# Patient Record
Sex: MALE | Race: WHITE | NOT HISPANIC OR LATINO | Employment: FULL TIME | URBAN - METROPOLITAN AREA
[De-identification: names, ages, dates, MRNs, and addresses within clinical notes are randomized per-mention and may not be internally consistent; named-entity substitution may affect disease eponyms.]

---

## 2018-08-17 ENCOUNTER — HOSPITAL ENCOUNTER (INPATIENT)
Facility: HOSPITAL | Age: 53
LOS: 3 days | Discharge: HOME | DRG: 419 | End: 2018-08-20
Attending: FAMILY MEDICINE | Admitting: SURGERY
Payer: COMMERCIAL

## 2018-08-17 DIAGNOSIS — K81.0 ACUTE GANGRENOUS CHOLECYSTITIS: ICD-10-CM

## 2018-08-17 DIAGNOSIS — K81.0 CHOLECYSTITIS, ACUTE: Primary | ICD-10-CM

## 2018-08-17 LAB
ALBUMIN SERPL BCP-MCNC: 4.4 G/DL
ALP SERPL-CCNC: 61 U/L
ALT SERPL W/O P-5'-P-CCNC: 19 U/L
ANION GAP SERPL CALC-SCNC: 12 MMOL/L
AST SERPL-CCNC: 28 U/L
BACTERIA #/AREA URNS AUTO: ABNORMAL /HPF
BASOPHILS # BLD AUTO: 0.01 K/UL
BASOPHILS NFR BLD: 0.1 %
BILIRUB SERPL-MCNC: 3.2 MG/DL
BILIRUB UR QL STRIP: ABNORMAL
BUN SERPL-MCNC: 15 MG/DL
CALCIUM SERPL-MCNC: 9.3 MG/DL
CHLORIDE SERPL-SCNC: 104 MMOL/L
CLARITY UR REFRACT.AUTO: CLEAR
CO2 SERPL-SCNC: 25 MMOL/L
COLOR UR AUTO: ABNORMAL
CREAT SERPL-MCNC: 1.28 MG/DL
DIFFERENTIAL METHOD: ABNORMAL
EOSINOPHIL # BLD AUTO: 0 K/UL
EOSINOPHIL NFR BLD: 0 %
ERYTHROCYTE [DISTWIDTH] IN BLOOD BY AUTOMATED COUNT: 12.4 %
EST. GFR  (AFRICAN AMERICAN): >60 ML/MIN/1.73 M^2
EST. GFR  (NON AFRICAN AMERICAN): >60 ML/MIN/1.73 M^2
GLUCOSE SERPL-MCNC: 154 MG/DL
GLUCOSE UR QL STRIP: NEGATIVE
GRAN CASTS UR QL COMP ASSIST: 2 /LPF
HCT VFR BLD AUTO: 43.7 %
HGB BLD-MCNC: 14.9 G/DL
HGB UR QL STRIP: ABNORMAL
HYALINE CASTS UR QL AUTO: 10 /LPF
KETONES UR QL STRIP: ABNORMAL
LACTATE SERPL-SCNC: 1.4 MMOL/L
LEUKOCYTE ESTERASE UR QL STRIP: ABNORMAL
LIPASE SERPL-CCNC: 107 U/L
LYMPHOCYTES # BLD AUTO: 0.6 K/UL
LYMPHOCYTES NFR BLD: 4.1 %
MCH RBC QN AUTO: 30.6 PG
MCHC RBC AUTO-ENTMCNC: 34.1 G/DL
MCV RBC AUTO: 90 FL
MICROSCOPIC COMMENT: ABNORMAL
MONOCYTES # BLD AUTO: 1.2 K/UL
MONOCYTES NFR BLD: 8.6 %
NEUTROPHILS # BLD AUTO: 12.1 K/UL
NEUTROPHILS NFR BLD: 87 %
NITRITE UR QL STRIP: NEGATIVE
PH UR STRIP: 5 [PH] (ref 5–8)
PLATELET # BLD AUTO: 193 K/UL
PMV BLD AUTO: 10.6 FL
POTASSIUM SERPL-SCNC: 4 MMOL/L
PROT SERPL-MCNC: 7.4 G/DL
PROT UR QL STRIP: ABNORMAL
RBC # BLD AUTO: 4.87 M/UL
RBC #/AREA URNS AUTO: 2 /HPF (ref 0–4)
SODIUM SERPL-SCNC: 141 MMOL/L
SP GR UR STRIP: 1.01 (ref 1–1.03)
URN SPEC COLLECT METH UR: ABNORMAL
UROBILINOGEN UR STRIP-ACNC: 1 EU/DL
WAXY CASTS UR QL COMP ASSIST: 1 /LPF
WBC # BLD AUTO: 13.85 K/UL
WBC #/AREA URNS AUTO: 10 /HPF (ref 0–5)

## 2018-08-17 PROCEDURE — 96366 THER/PROPH/DIAG IV INF ADDON: CPT

## 2018-08-17 PROCEDURE — 96361 HYDRATE IV INFUSION ADD-ON: CPT

## 2018-08-17 PROCEDURE — 63600175 PHARM REV CODE 636 W HCPCS: Performed by: NURSE PRACTITIONER

## 2018-08-17 PROCEDURE — 80053 COMPREHEN METABOLIC PANEL: CPT

## 2018-08-17 PROCEDURE — 83690 ASSAY OF LIPASE: CPT

## 2018-08-17 PROCEDURE — 25500020 PHARM REV CODE 255: Performed by: NURSE PRACTITIONER

## 2018-08-17 PROCEDURE — 96375 TX/PRO/DX INJ NEW DRUG ADDON: CPT

## 2018-08-17 PROCEDURE — 96365 THER/PROPH/DIAG IV INF INIT: CPT

## 2018-08-17 PROCEDURE — 96367 TX/PROPH/DG ADDL SEQ IV INF: CPT

## 2018-08-17 PROCEDURE — 63600175 PHARM REV CODE 636 W HCPCS: Performed by: SURGERY

## 2018-08-17 PROCEDURE — 85025 COMPLETE CBC W/AUTO DIFF WBC: CPT

## 2018-08-17 PROCEDURE — 99285 EMERGENCY DEPT VISIT HI MDM: CPT | Mod: 25

## 2018-08-17 PROCEDURE — 81000 URINALYSIS NONAUTO W/SCOPE: CPT

## 2018-08-17 PROCEDURE — 87040 BLOOD CULTURE FOR BACTERIA: CPT

## 2018-08-17 PROCEDURE — 25000003 PHARM REV CODE 250: Performed by: NURSE PRACTITIONER

## 2018-08-17 PROCEDURE — 25000003 PHARM REV CODE 250: Performed by: STUDENT IN AN ORGANIZED HEALTH CARE EDUCATION/TRAINING PROGRAM

## 2018-08-17 PROCEDURE — S0030 INJECTION, METRONIDAZOLE: HCPCS | Performed by: NURSE PRACTITIONER

## 2018-08-17 PROCEDURE — 11000001 HC ACUTE MED/SURG PRIVATE ROOM

## 2018-08-17 PROCEDURE — 83605 ASSAY OF LACTIC ACID: CPT

## 2018-08-17 RX ORDER — SODIUM CHLORIDE, SODIUM LACTATE, POTASSIUM CHLORIDE, CALCIUM CHLORIDE 600; 310; 30; 20 MG/100ML; MG/100ML; MG/100ML; MG/100ML
INJECTION, SOLUTION INTRAVENOUS CONTINUOUS
Status: DISCONTINUED | OUTPATIENT
Start: 2018-08-17 | End: 2018-08-19

## 2018-08-17 RX ORDER — ONDANSETRON 2 MG/ML
4 INJECTION INTRAMUSCULAR; INTRAVENOUS EVERY 6 HOURS PRN
Status: DISCONTINUED | OUTPATIENT
Start: 2018-08-17 | End: 2018-08-20 | Stop reason: HOSPADM

## 2018-08-17 RX ORDER — MORPHINE SULFATE 4 MG/ML
4 INJECTION, SOLUTION INTRAMUSCULAR; INTRAVENOUS
Status: COMPLETED | OUTPATIENT
Start: 2018-08-17 | End: 2018-08-17

## 2018-08-17 RX ORDER — ACETAMINOPHEN 325 MG/1
650 TABLET ORAL EVERY 6 HOURS PRN
Status: DISCONTINUED | OUTPATIENT
Start: 2018-08-17 | End: 2018-08-20 | Stop reason: HOSPADM

## 2018-08-17 RX ORDER — METRONIDAZOLE 500 MG/100ML
500 INJECTION, SOLUTION INTRAVENOUS
Status: COMPLETED | OUTPATIENT
Start: 2018-08-17 | End: 2018-08-17

## 2018-08-17 RX ORDER — DICYCLOMINE HYDROCHLORIDE 10 MG/ML
20 INJECTION INTRAMUSCULAR
Status: ACTIVE | OUTPATIENT
Start: 2018-08-17 | End: 2018-08-17

## 2018-08-17 RX ORDER — ONDANSETRON 2 MG/ML
4 INJECTION INTRAMUSCULAR; INTRAVENOUS
Status: COMPLETED | OUTPATIENT
Start: 2018-08-17 | End: 2018-08-17

## 2018-08-17 RX ORDER — ONDANSETRON 4 MG/1
4 TABLET, FILM COATED ORAL EVERY 6 HOURS
Qty: 12 TABLET | Refills: 0 | Status: SHIPPED | OUTPATIENT
Start: 2018-08-17 | End: 2018-08-17 | Stop reason: SDUPTHER

## 2018-08-17 RX ORDER — OMEPRAZOLE 20 MG/1
20 TABLET, DELAYED RELEASE ORAL DAILY
COMMUNITY

## 2018-08-17 RX ORDER — SODIUM CHLORIDE 9 MG/ML
1000 INJECTION, SOLUTION INTRAVENOUS
Status: COMPLETED | OUTPATIENT
Start: 2018-08-17 | End: 2018-08-17

## 2018-08-17 RX ORDER — ACETAMINOPHEN 325 MG/1
650 TABLET ORAL
Status: COMPLETED | OUTPATIENT
Start: 2018-08-17 | End: 2018-08-17

## 2018-08-17 RX ORDER — CIPROFLOXACIN 2 MG/ML
400 INJECTION, SOLUTION INTRAVENOUS
Status: COMPLETED | OUTPATIENT
Start: 2018-08-17 | End: 2018-08-17

## 2018-08-17 RX ORDER — MORPHINE SULFATE 4 MG/ML
4 INJECTION, SOLUTION INTRAMUSCULAR; INTRAVENOUS
Status: DISPENSED | OUTPATIENT
Start: 2018-08-17 | End: 2018-08-18

## 2018-08-17 RX ORDER — KETOROLAC TROMETHAMINE 30 MG/ML
INJECTION, SOLUTION INTRAMUSCULAR; INTRAVENOUS
Status: DISPENSED
Start: 2018-08-17 | End: 2018-08-18

## 2018-08-17 RX ORDER — MORPHINE SULFATE 2 MG/ML
1 INJECTION, SOLUTION INTRAMUSCULAR; INTRAVENOUS EVERY 4 HOURS PRN
Status: DISCONTINUED | OUTPATIENT
Start: 2018-08-17 | End: 2018-08-18

## 2018-08-17 RX ORDER — HYDROGEN PEROXIDE 3 %
20 SOLUTION, NON-ORAL MISCELLANEOUS
COMMUNITY

## 2018-08-17 RX ORDER — KETOROLAC TROMETHAMINE 30 MG/ML
15 INJECTION, SOLUTION INTRAMUSCULAR; INTRAVENOUS EVERY 6 HOURS PRN
Status: DISCONTINUED | OUTPATIENT
Start: 2018-08-17 | End: 2018-08-20 | Stop reason: HOSPADM

## 2018-08-17 RX ORDER — KETOROLAC TROMETHAMINE 30 MG/ML
30 INJECTION, SOLUTION INTRAMUSCULAR; INTRAVENOUS
Status: COMPLETED | OUTPATIENT
Start: 2018-08-17 | End: 2018-08-17

## 2018-08-17 RX ORDER — ONDANSETRON 4 MG/1
4 TABLET, FILM COATED ORAL EVERY 6 HOURS
Qty: 12 TABLET | Refills: 0 | Status: SHIPPED | OUTPATIENT
Start: 2018-08-17 | End: 2018-08-20 | Stop reason: SDUPTHER

## 2018-08-17 RX ADMIN — ONDANSETRON 4 MG: 2 INJECTION INTRAMUSCULAR; INTRAVENOUS at 12:08

## 2018-08-17 RX ADMIN — METRONIDAZOLE 500 MG: 500 INJECTION, SOLUTION INTRAVENOUS at 02:08

## 2018-08-17 RX ADMIN — CIPROFLOXACIN 400 MG: 2 INJECTION, SOLUTION INTRAVENOUS at 04:08

## 2018-08-17 RX ADMIN — KETOROLAC TROMETHAMINE 30 MG: 30 INJECTION, SOLUTION INTRAMUSCULAR at 02:08

## 2018-08-17 RX ADMIN — SODIUM CHLORIDE 1000 ML: 0.9 INJECTION, SOLUTION INTRAVENOUS at 04:08

## 2018-08-17 RX ADMIN — ACETAMINOPHEN 650 MG: 325 TABLET ORAL at 01:08

## 2018-08-17 RX ADMIN — PIPERACILLIN AND TAZOBACTAM 4.5 G: 4; .5 INJECTION, POWDER, LYOPHILIZED, FOR SOLUTION INTRAVENOUS; PARENTERAL at 10:08

## 2018-08-17 RX ADMIN — SODIUM CHLORIDE 1000 ML: 0.9 INJECTION, SOLUTION INTRAVENOUS at 12:08

## 2018-08-17 RX ADMIN — SODIUM CHLORIDE, SODIUM LACTATE, POTASSIUM CHLORIDE, AND CALCIUM CHLORIDE: .6; .31; .03; .02 INJECTION, SOLUTION INTRAVENOUS at 09:08

## 2018-08-17 RX ADMIN — IOHEXOL 100 ML: 350 INJECTION, SOLUTION INTRAVENOUS at 02:08

## 2018-08-17 RX ADMIN — MORPHINE SULFATE 4 MG: 4 INJECTION INTRAVENOUS at 04:08

## 2018-08-17 NOTE — ED NOTES
Pt sitting up watching TV resting comfort. States pain 3/10. Call bell within reach, bed low position.m

## 2018-08-17 NOTE — ED PROVIDER NOTES
Current Discharge Medication List      START taking these medications    Details   ondansetron (ZOFRAN) 4 MG tablet Take 1 tablet (4 mg total) by mouth every 6 (six) hours.  Qty: 12 tablet, Refills: 0          eMERGENCY dEPARTMENT eNCOUnter    CHIEF COMPLAINT    Chief Complaint   Patient presents with    Emesis     Pt reports abdominal pain and emesis that started last night. Pt reports he thinks he has food poisining. Sent from urgent care    Abdominal Pain       HPI    Hamlet Villalobos is a 52 y.o. male who presents to the ED with abdominal pain and vomiting that began last night.  He states he is vomiting constantly aching keep anything down.  Reports cramping upper abdominal pain that is constant.  He denies fever or diarrhea.  He states he thinks he has food poisoning from a burritio that he bought from [employer].  States the vomiting and stomach pain began about 20 min after he ate.  He denies difficulty urinating.    CURRENT MEDICATIONS    No current facility-administered medications on file prior to encounter.      Current Outpatient Medications on File Prior to Encounter   Medication Sig Dispense Refill    esomeprazole (NEXIUM) 20 MG capsule Take 20 mg by mouth before breakfast.      omeprazole (PRILOSEC OTC) 20 MG tablet Take 20 mg by mouth once daily.           ALLERGIES    Review of patient's allergies indicates:  No Known Allergies    PAST MEDICAL HISTORY  Past Medical History:   Diagnosis Date    Acid reflux     Barretts esophagus        SURGICAL HISTORY    History reviewed. No pertinent surgical history.    SOCIAL HISTORY    Social History     Socioeconomic History    Marital status:      Spouse name: None    Number of children: None    Years of education: None    Highest education level: None   Social Needs    Financial resource strain: None    Food insecurity - worry: None    Food insecurity - inability: None    Transportation needs - medical: None    Transportation needs -  "non-medical: None   Occupational History    None   Tobacco Use    Smoking status: Never Smoker    Smokeless tobacco: Never Used   Substance and Sexual Activity    Alcohol use: No     Frequency: Never    Drug use: No    Sexual activity: None   Other Topics Concern    None   Social History Narrative    None       FAMILY HISTORY    History reviewed. No pertinent family history.    REVIEW OF SYSTEMS   ROS  Constitutional:  No fever, chills, weight loss or weakness.   Eyes:  No  Photophobia, blurred vision or discharge.   HENT:  No ear pain, nasal congestion or sore throat..  Respiratory:  No cough, shortness of breath or wheezing.   Cardiovascular:  No chest pain, palpitations or swelling.   GI:  Reports abdominal pain, nausea, vomiting, no diarrhea.  : No dysuria, frequency   Musculoskeletal:  No back pain or neck pain.   Skin:  No reported rashes or infected lesions.   Neurologic:  No reported headache.  All Systems otherwise negative except as noted in the History of Present Illness.        PHYSICAL EXAM    Reviewed Triage Note  VITAL SIGNS: /62   Pulse 106   Temp (!) 100.6 °F (38.1 °C) (Oral)   Resp 19   Ht 5' 11" (1.803 m)   Wt 97.5 kg (215 lb)   SpO2 99%   BMI 29.99 kg/m²    Vitals:    08/17/18 1455   BP: 111/62   Pulse:    Resp: 19   Temp: (!) 100.6 °F (38.1 °C)       Physical Exam  Nursing Notes and Vital Signs Reviewed  Constitutional:  Well-developed, well-nourished, 52-year-old male, who is uncomfortable, but in NAD..  HENT:  Normocephalic, atraumatic. Bilateral external EACs normal, Bilateral TMs normal. Nose normal, no nasal mucosal edema, no rhinorrhea. Mouth mucus membranes P & M, no oral lesions. Oropharynx no erythema, edema or exudate, uvula midline.   Eyes:  PERRL EOMI. Conjunctiva normal without discharge.   Neck: Normal range of motion. No midline tenderness or vertebral step-off. No stridor. No meningismus. No lymphadenopathy.   Respiratory:  Normal breath sounds bilaterally. "  No respiratory distress, retractions, or conversational dyspnea. No wheezing. No rhonchi. No rales.   Cardiovascular:  Normal heart rate. Normal rhythm. No pitting lower extremity edema.   GI:  Abdomen soft, non-distended, tender RUQ and epigastrium. Normal bowel sounds. No guarding, rigidity or rebound.    : No CVA tenderness.   Integument:  Warm and dry. No rash. No petechiae  Neurologic:  . Alert and Interactive. MAEW.  Gait steady.  Psychiatric:  Affect normal. Mood normal.         LABS  Pertinent labs reviewed. (See chart for details)           RADIOLOGY    Imaging Results          CT Abdomen Pelvis With Contrast (Final result)  Result time 08/17/18 14:46:59    Final result by Carmine Post MD (08/17/18 14:46:59)                 Impression:      1 cm likely partially calcified polyp in the fundus of the gallbladder.  There is fat stranding surrounding the gallbladder, mild hyperemia of the gallbladder wall, and trace pericholecystic fluid. The gallbladder is mildly distended measuring 8.3 x 4.1 cm. Findings are moderately suspicious for cholecystitis. Consider HIDA scan.      Electronically signed by: Carmine Post  Date:    08/17/2018  Time:    14:46             Narrative:    EXAMINATION:  CT ABDOMEN PELVIS WITH CONTRAST    CLINICAL HISTORY:  Abd pain, gastroenteritis or colitis suspected;    TECHNIQUE:  Low dose axial images, sagittal and coronal reformations were obtained from the lung bases to the pubic symphysis following the IV administration of 100 mL of Omnipaque 350 .  Oral contrast was not administered.    COMPARISON:  Abdominal ultrasound same day 08/17/2018    FINDINGS:  ABDOMEN:    - Lung bases: SUBSEGMENTAL ATELECTASIS, WITH GROUND-GLASS OF THE BILATERAL LUNG BASES.    - Liver: FATTY INFILTRATION OF THE LIVER.    - Gallbladder: 1 cm likely partially calcified polyp in the fundus of the gallbladder.  There is fat stranding surrounding the gallbladder, mild hyperemia of the gallbladder wall, and  trace pericholecystic fluid.  The gallbladder is mildly distended measuring 8.3 x 4.1 cm.  Findings are moderately suspicious for cholecystitis.  Consider HIDA scan.    - Bile Ducts: No evidence of intra ductal dilation.  No calcified stones seen in the common bile duct.  Common bile duct is mildly prominent at 8 mm.    - Spleen: Negative.    - Kidneys: No mass or hydronephrosis.    - Adrenals: Unremarkable.    - Pancreas: No mass or peripancreatic fat stranding.    - Retroperitoneum:  No significant adenopathy.    - Vascular: No abdominal aortic aneurysm.    - Abdominal wall:  Unremarkable.    PELVIS:    No pelvic mass, adenopathy.  Trace free fluid in the pelvis.    BOWEL/MESENTERY:    No evidence of bowel obstruction or inflammation.    BONES:  No acute osseous abnormality and no suspicious lytic or blastic lesion.    All CT scans at this facility use dose modulation, iterative reconstruction and/or weight based dosing when appropriate to reduce radiation dose to as low as reasonably achievable.                               US Abdomen Limited (Final result)  Result time 08/17/18 12:45:03    Final result by Carmine Post MD (08/17/18 12:45:03)                 Impression:      Hepatomegaly with steatosis.    1 cm ill-defined echogenic focus in the fundus of the gallbladder, stone versus polyp. Recommend CT abdomen pelvis with IV contrast for further evaluation on a nonemergent basis or as clinically warranted.  Borderline gallbladder wall thickening measuring 3 mm thickness. Gallbladder is mildly distended measuring 11.6 x 4.1 cm. There is questionable pericholecystic fluid. No sonographic Green's sign.  This could also be evaluated with the above-described CT, or HIDA scan.      Electronically signed by: Carmine Post  Date:    08/17/2018  Time:    12:45             Narrative:    EXAMINATION:  US ABDOMEN LIMITED    CLINICAL HISTORY:  Abdominal Pain - Gallbladder;    TECHNIQUE:  Limited ultrasound of the right  upper quadrant of the abdomen (including pancreas, liver, gallbladder, common bile duct, and right kidney) was performed.    COMPARISON:  None.    FINDINGS:  Liver: Hepatomegaly measuring 18.3 cm.  Homogeneous increased echotexture.  No focal hepatic lesions.    Gallbladder: 1 cm ill-defined echogenic focus in the fundus of the gallbladder, stone versus polyp.  Recommend CT abdomen pelvis with IV contrast for further evaluation.  Borderline gallbladder wall thickening measuring 3 mm thickness.  Gallbladder is mildly distended measuring 11.6 x 4.1 cm.  There is questionable pericholecystic fluid.  No sonographic Green's sign.    Biliary system: The common duct is not dilated, measuring 3.4 mm.  No intrahepatic ductal dilatation.    Right kidney: Normal in size with no hydronephrosis, measuring 10.7 cm.    Miscellaneous: No upper abdominal ascites.                                PROCEDURES    Procedures      EKG         ED COURSE & MEDICAL DECISION MAKING    Pertinent & Imaging studies reviewed. (See chart for details and specific orders.)  Patient with cholecystitis on ultrasound and CT scan.  He developed a 101.8 temp on return from ultrasound and his white count is elevated.  I discussed these findings with the patient and advised him that he needed to be transferred to have his gallbladder removed today.  He is refusing transfer for because he is here visiting from [place].  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  He states he has a 10 hr drive home.  He was advised against that and he is adamant that he cannot do the surgery here in [place] needs to be at home.[id number] Upon further discussion and talking with his family via phone. The patient agrees to be admitted for surgery. 1535 Spoke with Dr. Mathew General Surgeon he accepted the patient.       Medications   dicyclomine injection 20 mg (20 mg Intramuscular Not Given 8/17/18 1130)   morphine  injection 4 mg (4 mg Intravenous Not Given 8/17/18 1400)   ketorolac (TORADOL) 30 mg/mL (1 mL) injection (not administered)   sodium chloride 0.9% bolus 1,000 mL (0 mLs Intravenous Stopped 8/17/18 1457)   ondansetron injection 4 mg (4 mg Intravenous Given 8/17/18 1226)   metronidazole IVPB 500 mg (500 mg Intravenous New Bag 8/17/18 1400)   acetaminophen tablet 650 mg (650 mg Oral Given 8/17/18 1313)   ketorolac injection 30 mg (30 mg Intravenous Given 8/17/18 1406)   omnipaque 350 iohexol 100 mL (100 mLs Intravenous Given 8/17/18 1428)           FINAL IMPRESSION    1. Cholecystitis, acute        Differential Diagnosis:  Food poisoning, appendicitis, diverticulitis, small-bowel obstruction.    Patient admitted to [facility] for surgery.                  Cony Overton NP  08/17/18 1537       Cony Overton NP  08/17/18 1546

## 2018-08-17 NOTE — ED NOTES
"Pt resting comfortably watching TV. States his pain "Is not too bad right now as long as I don't move" pain 3/10  "

## 2018-08-17 NOTE — ED TRIAGE NOTES
Pt reports abdominal pain and emesis that started last night. Pt reports he thinks he has food poisining. Sent from urgent care

## 2018-08-18 ENCOUNTER — ANESTHESIA EVENT (OUTPATIENT)
Dept: SURGERY | Facility: HOSPITAL | Age: 53
DRG: 419 | End: 2018-08-18
Payer: COMMERCIAL

## 2018-08-18 ENCOUNTER — ANESTHESIA (OUTPATIENT)
Dept: SURGERY | Facility: HOSPITAL | Age: 53
DRG: 419 | End: 2018-08-18
Payer: COMMERCIAL

## 2018-08-18 LAB
ALBUMIN SERPL BCP-MCNC: 3.4 G/DL
ALP SERPL-CCNC: 60 U/L
ALT SERPL W/O P-5'-P-CCNC: 12 U/L
ANION GAP SERPL CALC-SCNC: 7 MMOL/L
AST SERPL-CCNC: 23 U/L
BASOPHILS # BLD AUTO: 0.01 K/UL
BASOPHILS NFR BLD: 0.1 %
BILIRUB SERPL-MCNC: 2.8 MG/DL
BUN SERPL-MCNC: 21 MG/DL
CALCIUM SERPL-MCNC: 8.6 MG/DL
CHLORIDE SERPL-SCNC: 106 MMOL/L
CO2 SERPL-SCNC: 27 MMOL/L
CREAT SERPL-MCNC: 1.4 MG/DL
DIFFERENTIAL METHOD: ABNORMAL
EOSINOPHIL # BLD AUTO: 0 K/UL
EOSINOPHIL NFR BLD: 0 %
ERYTHROCYTE [DISTWIDTH] IN BLOOD BY AUTOMATED COUNT: 12.8 %
EST. GFR  (AFRICAN AMERICAN): >60 ML/MIN/1.73 M^2
EST. GFR  (NON AFRICAN AMERICAN): 57 ML/MIN/1.73 M^2
GLUCOSE SERPL-MCNC: 123 MG/DL
HCT VFR BLD AUTO: 38.5 %
HGB BLD-MCNC: 12.7 G/DL
LYMPHOCYTES # BLD AUTO: 0.6 K/UL
LYMPHOCYTES NFR BLD: 6 %
MCH RBC QN AUTO: 30.2 PG
MCHC RBC AUTO-ENTMCNC: 33 G/DL
MCV RBC AUTO: 92 FL
MONOCYTES # BLD AUTO: 0.7 K/UL
MONOCYTES NFR BLD: 7.2 %
NEUTROPHILS # BLD AUTO: 8.8 K/UL
NEUTROPHILS NFR BLD: 86.4 %
PLATELET # BLD AUTO: 133 K/UL
PMV BLD AUTO: 10.5 FL
POTASSIUM SERPL-SCNC: 4 MMOL/L
PROT SERPL-MCNC: 6.1 G/DL
RBC # BLD AUTO: 4.2 M/UL
SODIUM SERPL-SCNC: 140 MMOL/L
WBC # BLD AUTO: 10.14 K/UL

## 2018-08-18 PROCEDURE — 36415 COLL VENOUS BLD VENIPUNCTURE: CPT

## 2018-08-18 PROCEDURE — 87186 SC STD MICRODIL/AGAR DIL: CPT | Mod: 59

## 2018-08-18 PROCEDURE — C9290 INJ, BUPIVACAINE LIPOSOME: HCPCS | Performed by: SURGERY

## 2018-08-18 PROCEDURE — 37000009 HC ANESTHESIA EA ADD 15 MINS: Performed by: SURGERY

## 2018-08-18 PROCEDURE — 63600175 PHARM REV CODE 636 W HCPCS: Performed by: SURGERY

## 2018-08-18 PROCEDURE — BF131ZZ FLUOROSCOPY OF GALLBLADDER AND BILE DUCTS USING LOW OSMOLAR CONTRAST: ICD-10-PCS | Performed by: SURGERY

## 2018-08-18 PROCEDURE — 88304 TISSUE EXAM BY PATHOLOGIST: CPT | Mod: 26,,, | Performed by: PATHOLOGY

## 2018-08-18 PROCEDURE — 27000221 HC OXYGEN, UP TO 24 HOURS

## 2018-08-18 PROCEDURE — 94664 DEMO&/EVAL PT USE INHALER: CPT

## 2018-08-18 PROCEDURE — 25000003 PHARM REV CODE 250: Performed by: STUDENT IN AN ORGANIZED HEALTH CARE EDUCATION/TRAINING PROGRAM

## 2018-08-18 PROCEDURE — 63600175 PHARM REV CODE 636 W HCPCS: Performed by: STUDENT IN AN ORGANIZED HEALTH CARE EDUCATION/TRAINING PROGRAM

## 2018-08-18 PROCEDURE — 87116 MYCOBACTERIA CULTURE: CPT

## 2018-08-18 PROCEDURE — 80053 COMPREHEN METABOLIC PANEL: CPT

## 2018-08-18 PROCEDURE — 25000003 PHARM REV CODE 250: Performed by: SURGERY

## 2018-08-18 PROCEDURE — 11000001 HC ACUTE MED/SURG PRIVATE ROOM

## 2018-08-18 PROCEDURE — 27201423 OPTIME MED/SURG SUP & DEVICES STERILE SUPPLY: Performed by: SURGERY

## 2018-08-18 PROCEDURE — 0FT44ZZ RESECTION OF GALLBLADDER, PERCUTANEOUS ENDOSCOPIC APPROACH: ICD-10-PCS | Performed by: SURGERY

## 2018-08-18 PROCEDURE — 37000008 HC ANESTHESIA 1ST 15 MINUTES: Performed by: SURGERY

## 2018-08-18 PROCEDURE — 25500020 PHARM REV CODE 255: Performed by: SURGERY

## 2018-08-18 PROCEDURE — 36000708 HC OR TIME LEV III 1ST 15 MIN: Performed by: SURGERY

## 2018-08-18 PROCEDURE — 87070 CULTURE OTHR SPECIMN AEROBIC: CPT

## 2018-08-18 PROCEDURE — 87077 CULTURE AEROBIC IDENTIFY: CPT

## 2018-08-18 PROCEDURE — 94761 N-INVAS EAR/PLS OXIMETRY MLT: CPT

## 2018-08-18 PROCEDURE — 71000033 HC RECOVERY, INTIAL HOUR: Performed by: SURGERY

## 2018-08-18 PROCEDURE — 87015 SPECIMEN INFECT AGNT CONCNTJ: CPT

## 2018-08-18 PROCEDURE — 94799 UNLISTED PULMONARY SVC/PX: CPT

## 2018-08-18 PROCEDURE — 87075 CULTR BACTERIA EXCEPT BLOOD: CPT

## 2018-08-18 PROCEDURE — 63600175 PHARM REV CODE 636 W HCPCS: Performed by: FAMILY MEDICINE

## 2018-08-18 PROCEDURE — 36000709 HC OR TIME LEV III EA ADD 15 MIN: Performed by: SURGERY

## 2018-08-18 PROCEDURE — 87205 SMEAR GRAM STAIN: CPT

## 2018-08-18 PROCEDURE — 87206 SMEAR FLUORESCENT/ACID STAI: CPT

## 2018-08-18 PROCEDURE — 85025 COMPLETE CBC W/AUTO DIFF WBC: CPT

## 2018-08-18 PROCEDURE — 88304 TISSUE EXAM BY PATHOLOGIST: CPT | Performed by: PATHOLOGY

## 2018-08-18 PROCEDURE — 87102 FUNGUS ISOLATION CULTURE: CPT

## 2018-08-18 RX ORDER — CIPROFLOXACIN 2 MG/ML
400 INJECTION, SOLUTION INTRAVENOUS
Status: DISCONTINUED | OUTPATIENT
Start: 2018-08-18 | End: 2018-08-18

## 2018-08-18 RX ORDER — SODIUM CHLORIDE 9 MG/ML
INJECTION, SOLUTION INTRAVENOUS CONTINUOUS
Status: DISCONTINUED | OUTPATIENT
Start: 2018-08-18 | End: 2018-08-18

## 2018-08-18 RX ORDER — ROCURONIUM BROMIDE 10 MG/ML
INJECTION, SOLUTION INTRAVENOUS
Status: DISCONTINUED | OUTPATIENT
Start: 2018-08-18 | End: 2018-08-18

## 2018-08-18 RX ORDER — HYDROMORPHONE HYDROCHLORIDE 2 MG/ML
0.2 INJECTION, SOLUTION INTRAMUSCULAR; INTRAVENOUS; SUBCUTANEOUS EVERY 5 MIN PRN
Status: DISCONTINUED | OUTPATIENT
Start: 2018-08-18 | End: 2018-08-18

## 2018-08-18 RX ORDER — METRONIDAZOLE 500 MG/100ML
500 INJECTION, SOLUTION INTRAVENOUS
Status: DISCONTINUED | OUTPATIENT
Start: 2018-08-18 | End: 2018-08-18

## 2018-08-18 RX ORDER — ACETAMINOPHEN 10 MG/ML
INJECTION, SOLUTION INTRAVENOUS
Status: DISCONTINUED | OUTPATIENT
Start: 2018-08-18 | End: 2018-08-18

## 2018-08-18 RX ORDER — ONDANSETRON 2 MG/ML
4 INJECTION INTRAMUSCULAR; INTRAVENOUS EVERY 8 HOURS PRN
Status: DISCONTINUED | OUTPATIENT
Start: 2018-08-18 | End: 2018-08-18

## 2018-08-18 RX ORDER — SODIUM CHLORIDE, SODIUM LACTATE, POTASSIUM CHLORIDE, CALCIUM CHLORIDE 600; 310; 30; 20 MG/100ML; MG/100ML; MG/100ML; MG/100ML
INJECTION, SOLUTION INTRAVENOUS CONTINUOUS PRN
Status: DISCONTINUED | OUTPATIENT
Start: 2018-08-18 | End: 2018-08-18

## 2018-08-18 RX ORDER — POLYETHYLENE GLYCOL 3350 17 G/17G
17 POWDER, FOR SOLUTION ORAL DAILY
Status: DISCONTINUED | OUTPATIENT
Start: 2018-08-18 | End: 2018-08-20 | Stop reason: HOSPADM

## 2018-08-18 RX ORDER — SUCCINYLCHOLINE CHLORIDE 20 MG/ML
INJECTION INTRAMUSCULAR; INTRAVENOUS
Status: DISCONTINUED | OUTPATIENT
Start: 2018-08-18 | End: 2018-08-18

## 2018-08-18 RX ORDER — NEOSTIGMINE METHYLSULFATE 1 MG/ML
INJECTION, SOLUTION INTRAVENOUS
Status: DISCONTINUED | OUTPATIENT
Start: 2018-08-18 | End: 2018-08-18

## 2018-08-18 RX ORDER — ACETAMINOPHEN 325 MG/1
650 TABLET ORAL EVERY 8 HOURS PRN
Status: DISCONTINUED | OUTPATIENT
Start: 2018-08-18 | End: 2018-08-20 | Stop reason: HOSPADM

## 2018-08-18 RX ORDER — MORPHINE SULFATE 2 MG/ML
2 INJECTION, SOLUTION INTRAMUSCULAR; INTRAVENOUS EVERY 4 HOURS PRN
Status: DISCONTINUED | OUTPATIENT
Start: 2018-08-18 | End: 2018-08-20 | Stop reason: HOSPADM

## 2018-08-18 RX ORDER — MIDAZOLAM HYDROCHLORIDE 1 MG/ML
INJECTION INTRAMUSCULAR; INTRAVENOUS
Status: DISCONTINUED | OUTPATIENT
Start: 2018-08-18 | End: 2018-08-18

## 2018-08-18 RX ORDER — DEXAMETHASONE SODIUM PHOSPHATE 4 MG/ML
INJECTION, SOLUTION INTRA-ARTICULAR; INTRALESIONAL; INTRAMUSCULAR; INTRAVENOUS; SOFT TISSUE
Status: DISCONTINUED | OUTPATIENT
Start: 2018-08-18 | End: 2018-08-18

## 2018-08-18 RX ORDER — GLYCOPYRROLATE 0.2 MG/ML
INJECTION INTRAMUSCULAR; INTRAVENOUS
Status: DISCONTINUED | OUTPATIENT
Start: 2018-08-18 | End: 2018-08-18

## 2018-08-18 RX ORDER — FENTANYL CITRATE 50 UG/ML
INJECTION, SOLUTION INTRAMUSCULAR; INTRAVENOUS
Status: DISCONTINUED | OUTPATIENT
Start: 2018-08-18 | End: 2018-08-18

## 2018-08-18 RX ORDER — PHENYLEPHRINE HYDROCHLORIDE 10 MG/ML
INJECTION INTRAVENOUS
Status: DISCONTINUED | OUTPATIENT
Start: 2018-08-18 | End: 2018-08-18

## 2018-08-18 RX ORDER — SODIUM CHLORIDE 0.9 % (FLUSH) 0.9 %
3 SYRINGE (ML) INJECTION
Status: DISCONTINUED | OUTPATIENT
Start: 2018-08-18 | End: 2018-08-18

## 2018-08-18 RX ORDER — LABETALOL HYDROCHLORIDE 5 MG/ML
INJECTION, SOLUTION INTRAVENOUS
Status: DISCONTINUED | OUTPATIENT
Start: 2018-08-18 | End: 2018-08-18

## 2018-08-18 RX ORDER — ONDANSETRON 2 MG/ML
4 INJECTION INTRAMUSCULAR; INTRAVENOUS DAILY PRN
Status: COMPLETED | OUTPATIENT
Start: 2018-08-18 | End: 2018-08-18

## 2018-08-18 RX ORDER — PROPOFOL 10 MG/ML
VIAL (ML) INTRAVENOUS
Status: DISCONTINUED | OUTPATIENT
Start: 2018-08-18 | End: 2018-08-18

## 2018-08-18 RX ORDER — BUPIVACAINE HYDROCHLORIDE 2.5 MG/ML
INJECTION, SOLUTION EPIDURAL; INFILTRATION; INTRACAUDAL
Status: DISCONTINUED | OUTPATIENT
Start: 2018-08-18 | End: 2018-08-18 | Stop reason: HOSPADM

## 2018-08-18 RX ORDER — SODIUM CHLORIDE 0.9 G/100ML
IRRIGANT IRRIGATION
Status: DISCONTINUED | OUTPATIENT
Start: 2018-08-18 | End: 2018-08-18 | Stop reason: HOSPADM

## 2018-08-18 RX ORDER — LIDOCAINE HCL/PF 100 MG/5ML
SYRINGE (ML) INTRAVENOUS
Status: DISCONTINUED | OUTPATIENT
Start: 2018-08-18 | End: 2018-08-18

## 2018-08-18 RX ADMIN — FENTANYL CITRATE 50 MCG: 50 INJECTION, SOLUTION INTRAMUSCULAR; INTRAVENOUS at 10:08

## 2018-08-18 RX ADMIN — PHENYLEPHRINE HYDROCHLORIDE 100 MCG: 10 INJECTION INTRAVENOUS at 11:08

## 2018-08-18 RX ADMIN — KETOROLAC TROMETHAMINE 15 MG: 30 INJECTION, SOLUTION INTRAMUSCULAR at 05:08

## 2018-08-18 RX ADMIN — ROCURONIUM BROMIDE 20 MG: 10 INJECTION, SOLUTION INTRAVENOUS at 10:08

## 2018-08-18 RX ADMIN — SODIUM CHLORIDE, SODIUM LACTATE, POTASSIUM CHLORIDE, AND CALCIUM CHLORIDE: .6; .31; .03; .02 INJECTION, SOLUTION INTRAVENOUS at 04:08

## 2018-08-18 RX ADMIN — HYDROMORPHONE HYDROCHLORIDE 0.2 MG: 2 INJECTION INTRAMUSCULAR; INTRAVENOUS; SUBCUTANEOUS at 01:08

## 2018-08-18 RX ADMIN — NEOSTIGMINE METHYLSULFATE 5 MG: 1 INJECTION INTRAVENOUS at 01:08

## 2018-08-18 RX ADMIN — PROPOFOL 170 MG: 10 INJECTION, EMULSION INTRAVENOUS at 10:08

## 2018-08-18 RX ADMIN — SUCCINYLCHOLINE CHLORIDE 140 MG: 20 INJECTION, SOLUTION INTRAMUSCULAR; INTRAVENOUS at 10:08

## 2018-08-18 RX ADMIN — LABETALOL HYDROCHLORIDE 5 MG: 5 INJECTION, SOLUTION INTRAVENOUS at 12:08

## 2018-08-18 RX ADMIN — GLYCOPYRROLATE 0.6 MG: 0.2 INJECTION, SOLUTION INTRAMUSCULAR; INTRAVENOUS at 01:08

## 2018-08-18 RX ADMIN — PHENYLEPHRINE HYDROCHLORIDE 200 MCG: 10 INJECTION INTRAVENOUS at 10:08

## 2018-08-18 RX ADMIN — ACETAMINOPHEN 1000 MG: 10 INJECTION, SOLUTION INTRAVENOUS at 10:08

## 2018-08-18 RX ADMIN — FENTANYL CITRATE 50 MCG: 50 INJECTION, SOLUTION INTRAMUSCULAR; INTRAVENOUS at 11:08

## 2018-08-18 RX ADMIN — MORPHINE SULFATE 1 MG: 2 INJECTION, SOLUTION INTRAMUSCULAR; INTRAVENOUS at 08:08

## 2018-08-18 RX ADMIN — SODIUM CHLORIDE, SODIUM LACTATE, POTASSIUM CHLORIDE, AND CALCIUM CHLORIDE: .6; .31; .03; .02 INJECTION, SOLUTION INTRAVENOUS at 10:08

## 2018-08-18 RX ADMIN — PHENYLEPHRINE HYDROCHLORIDE 100 MCG: 10 INJECTION INTRAVENOUS at 10:08

## 2018-08-18 RX ADMIN — ROCURONIUM BROMIDE 10 MG: 10 INJECTION, SOLUTION INTRAVENOUS at 10:08

## 2018-08-18 RX ADMIN — POLYETHYLENE GLYCOL 3350 17 G: 17 POWDER, FOR SOLUTION ORAL at 04:08

## 2018-08-18 RX ADMIN — SODIUM CHLORIDE, SODIUM LACTATE, POTASSIUM CHLORIDE, AND CALCIUM CHLORIDE: .6; .31; .03; .02 INJECTION, SOLUTION INTRAVENOUS at 11:08

## 2018-08-18 RX ADMIN — MIDAZOLAM HYDROCHLORIDE 2 MG: 1 INJECTION, SOLUTION INTRAMUSCULAR; INTRAVENOUS at 10:08

## 2018-08-18 RX ADMIN — PIPERACILLIN AND TAZOBACTAM 4.5 G: 4; .5 INJECTION, POWDER, LYOPHILIZED, FOR SOLUTION INTRAVENOUS; PARENTERAL at 10:08

## 2018-08-18 RX ADMIN — DEXAMETHASONE SODIUM PHOSPHATE 4 MG: 4 INJECTION, SOLUTION INTRAMUSCULAR; INTRAVENOUS at 10:08

## 2018-08-18 RX ADMIN — ROCURONIUM BROMIDE 10 MG: 10 INJECTION, SOLUTION INTRAVENOUS at 11:08

## 2018-08-18 RX ADMIN — ONDANSETRON 4 MG: 2 INJECTION, SOLUTION INTRAMUSCULAR; INTRAVENOUS at 12:08

## 2018-08-18 RX ADMIN — KETOROLAC TROMETHAMINE 15 MG: 30 INJECTION, SOLUTION INTRAMUSCULAR at 08:08

## 2018-08-18 RX ADMIN — LIDOCAINE HYDROCHLORIDE 80 MG: 20 INJECTION, SOLUTION INTRAVENOUS at 10:08

## 2018-08-18 RX ADMIN — MORPHINE SULFATE 2 MG: 2 INJECTION, SOLUTION INTRAMUSCULAR; INTRAVENOUS at 07:08

## 2018-08-18 NOTE — PLAN OF CARE
Problem: Patient Care Overview  Goal: Plan of Care Review  Outcome: Ongoing (interventions implemented as appropriate)  Pt received on NC at 2 LPM. Pt SpO2 95%. No respiratory distress. Will continue to monitor.

## 2018-08-18 NOTE — ANESTHESIA PREPROCEDURE EVALUATION
08/18/2018  Hamlet Villalobos is a 52 y.o., male transferred to [facility] acute cholecystitis; for lap golden    PRIOR ANES (in Epic)   None    ANES-RELATED MED/SURG  Patient Active Problem List   Diagnosis    Cholecystitis, acute     Past Medical History:   Diagnosis Date    Acid reflux     Barretts esophagus      History reviewed. No pertinent surgical history.    ALLERGIES  Review of patient's allergies indicates:  No Known Allergies    ANES-RELATED MAR [id number]  Zosyn (next due 10:30 [id number]    Anesthesia Evaluation    I have reviewed the Patient Summary Reports.     I have reviewed the Medications.     Review of Systems  Anesthesia Hx:  No problems with previous Anesthesia  History of prior surgery of interest to airway management or planning: Previous anesthesia: MAC   Social:  Former Smoker    Cardiovascular:  Cardiovascular Normal Exercise tolerance: good     Pulmonary:  Pulmonary Normal    Renal/:  Renal/ Normal     Hepatic/GI:   GERD, poorly controlled    Neurological:  Neurology Normal    Endocrine:  Endocrine Normal      Wt Readings from Last 1 Encounters:   08/18/18 99.7 kg (219 lb 12.8 oz)     Temp Readings from Last 1 Encounters:   08/18/18 36.8 °C (98.2 °F) (Oral)     BP Readings from Last 1 Encounters:   08/18/18 105/61     Pulse Readings from Last 1 Encounters:   08/18/18 92     SpO2 Readings from Last 1 Encounters:   08/18/18 97%       Physical Exam  General:  Well nourished    Airway/Jaw/Neck:  Airway Findings: Mouth Opening: Normal Tongue: Normal  General Airway Assessment: Adult  Mallampati: I  TM Distance: Normal, at least 6 cm      Dental:  Dental Findings: Periodontal disease, Severe   Chest/Lungs:  Chest/Lungs Findings: Clear to auscultation     Heart/Vascular:  Heart Findings: Rate: Normal  Rhythm: Regular Rhythm        Mental Status:  Mental Status Findings:  Cooperative, Alert  and Oriented        Lab Results   Component Value Date    WBC 10.14 08/18/2018    HGB 12.7 (L) 08/18/2018    HCT 38.5 (L) 08/18/2018    MCV 92 08/18/2018     (L) 08/18/2018       Chemistry        Component Value Date/Time     08/18/2018 0319    K 4.0 08/18/2018 0319     08/18/2018 0319    CO2 27 08/18/2018 0319    BUN 21 (H) 08/18/2018 0319    CREATININE 1.4 08/18/2018 0319     (H) 08/18/2018 0319        Component Value Date/Time    CALCIUM 8.6 (L) 08/18/2018 0319    ALKPHOS 60 08/18/2018 0319    AST 23 08/18/2018 0319    ALT 12 08/18/2018 0319    BILITOT 2.8 (H) 08/18/2018 0319    ESTGFRAFRICA >60 08/18/2018 0319    EGFRNONAA 57 (A) 08/18/2018 0319            Lab Results   Component Value Date    ALBUMIN 3.4 (L) 08/18/2018    No results found for: TSH, G0OUCUN, D8AJMDC, THYROIDAB    CXR      EKG      ECHO        Anesthesia Plan  Type of Anesthesia, risks & benefits discussed:  Anesthesia Type:  general  Patient's Preference:   Intra-op Monitoring Plan: standard ASA monitors  Intra-op Monitoring Plan Comments:   Post Op Pain Control Plan:   Post Op Pain Control Plan Comments:   Induction:   IV  Beta Blocker:         Informed Consent: Patient understands risks and agrees with Anesthesia plan.  Questions answered. Anesthesia consent signed with patient.  ASA Score: 2     Day of Surgery Review of History & Physical:            Ready For Surgery From Anesthesia Perspective.

## 2018-08-18 NOTE — ANESTHESIA POSTPROCEDURE EVALUATION
"Anesthesia Post Evaluation    Patient: Hamlet Villalobos    Procedure(s) Performed: Procedure(s):  CHOLECYSTECTOMY, LAPAROSCOPIC, WITH CHOLANGIOGRAM    Final Anesthesia Type: general  Patient location during evaluation: PACU  Patient participation: Yes- Able to Participate  Level of consciousness: awake  Post-procedure vital signs: reviewed and stable  Pain management: adequate  Airway patency: patent  PONV status at discharge: No PONV  Anesthetic complications: no      Cardiovascular status: stable  Respiratory status: unassisted, spontaneous ventilation and nasal cannula  Hydration status: euvolemic  Follow-up needed         Visit Vitals  /69 (BP Location: Right arm, Patient Position: Lying)   Pulse (!) 59   Temp 37.4 °C (99.4 °F) (Axillary)   Resp 18   Ht 5' 11" (1.803 m)   Wt 99.7 kg (219 lb 12.8 oz)   SpO2 (!) 94%   BMI 30.66 kg/m²       Pain/Julieth Score: Pain Assessment Performed: Yes (8/18/2018  2:13 PM)  Presence of Pain: denies (8/18/2018  2:13 PM)  Pain Rating Prior to Med Admin: 3 (8/18/2018  1:45 PM)  Pain Rating Post Med Admin: 2 (8/18/2018  6:30 AM)  Julieth Score: 8 (8/18/2018  2:13 PM)        "

## 2018-08-18 NOTE — TRANSFER OF CARE
"Anesthesia Transfer of Care Note    Patient: Hamlet Villalobos    Procedure(s) Performed: Procedure(s):  CHOLECYSTECTOMY, LAPAROSCOPIC, WITH CHOLANGIOGRAM    Patient location: PACU    Anesthesia Type: general    Transport from OR: Transported from OR on 6-10 L/min O2 by face mask with adequate spontaneous ventilation    Post pain: adequate analgesia    Post assessment: no apparent anesthetic complications and tolerated procedure well    Post vital signs: stable    Level of consciousness: responds to stimulation and awake    Nausea/Vomiting: no nausea/vomiting    Complications: none    Transfer of care protocol was followed      Last vitals:   Visit Vitals  /79   Pulse 92   Temp 36.8 °C (98.2 °F) (Oral)   Resp 20   Ht 5' 11" (1.803 m)   Wt 99.7 kg (219 lb 12.8 oz)   SpO2 97%   BMI 30.66 kg/m²     "

## 2018-08-18 NOTE — NURSING
Pt. Arrived into room 532 via stretcher, AAOx3.  Oriented pt. To room, understanding verbalized.  Pt. Reported discomforts when positioning/movement score of 4 on pain scale 1-10.  Will  Monitor pt. Throughout shift.

## 2018-08-18 NOTE — PLAN OF CARE
Problem: Patient Care Overview  Goal: Plan of Care Review  Outcome: Revised  Reviewed plan of care with pt., understanding verbalized.

## 2018-08-18 NOTE — OP NOTE
[facility]  Surgery Department  Operative Note    SUMMARY     Date of Procedure: 8/18/2018     Procedure: Procedure(s):  CHOLECYSTECTOMY, LAPAROSCOPIC, WITH CHOLANGIOGRAM     Surgeon(s) and Role:     * Quincy Perez MD - Primary    Assisting Surgeon: Ramos easton    Pre-Operative Diagnosis: Cholecystitis, acute [K81.0]    Post-Operative Diagnosis: Post-Op Diagnosis Codes:     *acute gangrenous necrotic cholecystitis  Normal cholangiogram    Anesthesia: General    Technical Procedures Used:  4 port choolecystectomy    Description of the Findings of the Procedure: gangrenous  Necrotic gallbladder      Complications: no  Estimated Blood Loss (EBL): minimal           Implants: none  Specimens:   Specimen (12h ago, onward)    Start     Ordered    08/18/18 1241  Specimen to Pathology - Surgery  Once     Comments:  Pre-op Diagnosis: Cholecystitis, acute [K81.0]Post-op Diagnosis: sameProcedure(s):CHOLECYSTECTOMY, LAPAROSCOPIC, WITH CHOLANGIOGRAM Number of specimens: 1.Name of specimens: 1. Necrotic Gangrenous Gallbladder perm     Start Status   08/18/18 1241 Collected (08/18/18 1242)       08/18/18 1242    08/18/18 1104  Specimen to Pathology - Surgery  Once,   Status:  Canceled     Comments:  Pre-op Diagnosis: Cholecystitis, acute [K81.0]Post-op Diagnosis: sameProcedure(s):CHOLECYSTECTOMY, LAPAROSCOPIC, WITH CHOLANGIOGRAM Number of specimens: 1.Name of specimens: 1. Gallbladder perm     Start Status   08/18/18 1104 Canceled       08/18/18 1144            [id number]      Condition: Stable    Disposition: PACU - hemodynamically stable.    Attestation: I was present and scrubbed for the entire procedure.

## 2018-08-18 NOTE — PLAN OF CARE
08/18/18 1521   Discharge Assessment   Assessment Type Discharge Planning Assessment   Confirmed/corrected address and phone number on facesheet? No   Assessment information obtained from? Medical Record   Prior to hospitilization cognitive status: Alert/Oriented   Prior to hospitalization functional status: Independent   Current cognitive status: Coma/Sedated/Intubated   Current Functional Status: Needs Assistance   Facility Arrived From: [facility] in [place]   Able to Return to Prior Arrangements unable to determine at this time (comments)   Is patient able to care for self after discharge? Unable to determine at this time (comments)   Who are your caregiver(s) and their phone number(s)? Jillian Villalobos(other)[phone]   Patient's perception of discharge disposition home or selfcare   Readmission Within The Last 30 Days no previous admission in last 30 days   Patient currently being followed by outpatient case management? No   Patient currently receives any other outside agency services? No   Equipment Currently Used at Home none   Discharge Plan A Home with family   Discharge Plan B Home Health   Patient/Family In Agreement With Plan unable to assess   The Sw went into the pt's room but he was unable to complete the assessment due to being very drowsy so the Sw extracted the info from the medical records. The pt's a resident of [place] visiting here and got sick after eating a burrito from [employer]. He went to Urgent Care and was sent to [facility] in [place] where he was then transferred to [facility]. According to the medical records the pt plans on returning to [place].

## 2018-08-18 NOTE — PLAN OF CARE
Patient drowsy but easily arousable, pain controlled, abd clean, dry and intact, reported out to kristen vss

## 2018-08-18 NOTE — OP NOTE
DATE OF PROCEDURE:  08/18/2018    PREOPERATIVE DIAGNOSIS:  Acute cholecystitis.    POSTOPERATIVE DIAGNOSES:  1.  Acute necrotic gangrenous cholecystitis.  2.  Normal cholangiogram.    PROCEDURES DONE:  1.  Laparoscopic cholecystectomy with intraoperative cholangiogram.  2.  Fluoroscopy.    ANESTHESIA:  General endotracheal anesthesia.    SURGEON:  Wilmar Mathew M.D.    SENIOR RESIDENT:  Dr. Ramos Wall.    BLOOD LOSS:  Minimal.    The patient is stable, transferred to Recovery Room in stable condition with no   complication.    HISTORY AND INDICATION:  This is a 52-year-old gentleman who resides in [place]   was visiting here and he developed a severe right upper quadrant pain associated   with nausea and vomiting.  The pain was sudden in onset and excruciating.  He   has had an episode similar to this before, but not as significant and severe as   this.  He went to the [facility] ER.  From there he has been transferred here.    On examination, he had a significant right upper quadrant tenderness and a CAT   scan shows a thickened gallbladder wall consistent with acute cholecystitis.    On admission, his bilirubin was elevated, but his LFTs were normal; however,   following admission, his bilirubin started to descend, decrease and therefore, I   decided to go and plan for surgery.    FINDINGS:  The patient had a gangrenous necrotic gallbladder.  The gallbladder   was significantly thickened, edematous.  The cholangiogram was done.    Cholangiogram was normal showing efflux of dye into the duodenum without any   problem.  No intraluminal filling defect in the common bile duct.    PROCEDURE IN DETAIL:  The patient was brought to the Operating Room after making   sure he has voided, he was placed in supine position and was then sedated and   intubated.  Abdomen was prepped and draped in the usual sterile manner.  Timeout   was done to verify the ID of the patient and the procedure and everyone    concurred.    A small incision was made supraumbilically.  Veress needle was inserted into the   abdominal cavity and was insufflated with CO2.  After adequate insufflation, a   5-mm Optiview trocar was inserted into the abdominal cavity under direct vision.    Following this, under direct vision, an 11 mm port in the epigastric area and   two 5-mm ports in the right upper quadrants were placed.  The patient was placed   in the reverse Trendelenburg position with right side elevated.    The gallbladder was found to be thickened and engorged.  Needle was inserted   into the fundus of the gallbladder and bile was aspirated to decrease the   turgidity.  The gallbladder was then examined.  The patient had a necrotic   surface of the gallbladder with multiple gangrenous spots scattered all over the   gallbladder.  The fundus was grasped and was retracted superiorly into the   right side.  With extreme difficulty, the Asif's pouch was identified and   was dissected.  The cystic duct and cystic artery was meticulously identified   and dissected out.  The critical view was appreciated.  A clip was placed as   distal as possible on the cystic duct between the Asif's pouch and the   cystic duct junction.  The cystic duct was partially transected below the clip.    A cholangiogram catheter was inserted by placing a Jelco in the right upper   quadrant and inserting the cholangiogram catheter through the Jelco catheter.    The Jelco catheter was carefully introduced into the cystic duct opening and was   partially clipped and saline was instilled into this dye to make sure there is   no leakage and cholangiogram was done with concentrated Omnipaque dye.    Cholangiogram showed filling of the common bile duct and efflux of the dye into   the duodenum without any problem.  There was no other anatomical abnormality   that was visualized.    Following this, the clips securing the cholangiogram catheter was removed.  The    cystic duct was clipped proximally x3 and was transected just distal to that.    The cystic artery was also identified and was clipped proximally x2 and was   clipped distally once and was transected in between.  The gallbladder was safely   and meticulously dissected from the liver bed.  The liver bed was cauterized   because there was lot of inflammation in that area.  Following this, the right   upper quadrant was irrigated with copious amounts of antibiotic solution and was   completely drained.  Meticulous hemostasis was accomplished.  Again because of   the significant inflammation, I examined the cystic duct area.  There was no   more evidence of any oozing or bleeding in that area.  Following this, I sprayed   Jonna in that area followed by placement of Surgicel.  The right upper gutter   was completely drained.  Meticulous hemostasis was accomplished and the   gallbladder, which was placed in the Endobag was carefully retrieved outside.    Following this, the ports were all withdrawn.  The epigastric fascial incision   was approximated with interrupted 0 Vicryl stitch.  Marcaine and Exparel was   injected all the incisional area.  Skin was closed using 4-0 PDS and Dermabond   was applied.  The patient tolerated the procedure well and stable, was   extubated, taken to Recovery Room in stable condition.      TR/IN  dd: 08/18/2018 12:55:59 (CDT)  td: 08/18/2018 13:32:07 (CDANGELA)  Doc ID   #[id number]  Job ID #[id number]    CC:

## 2018-08-18 NOTE — H&P
[facility]/General Surgery  History & Physical    SUBJECTIVE:     Chief Complaint:   Abdominal pain    History of Present Illness:  53 yo M w/ PMH of GERD who presents as transfer from OSH with a one day history of nausea/vomiting and abdominal pain.  He reports the pain was sudden in onset.  His vomit was yellow/green in color, and did not have any blood.  He has been having periodic diarrhea.  He had symptoms like this one time in the past, which resolved spontaneously.  US at outside hospital consistent with cholelithiasis v cholecystitis.  Patient with bilirubin of 3.5 on admission, decreased to 2.8 today.    Allergies:  Review of patient's allergies indicates:  No Known Allergies    Home Medications:  No current facility-administered medications on file prior to encounter.      Current Outpatient Medications on File Prior to Encounter   Medication Sig    esomeprazole (NEXIUM) 20 MG capsule Take 20 mg by mouth before breakfast.    omeprazole (PRILOSEC OTC) 20 MG tablet Take 20 mg by mouth once daily.       Past Medical History:   Diagnosis Date    Acid reflux     Barretts esophagus      History reviewed. No pertinent surgical history.  History reviewed. No pertinent family history.  Social History     Tobacco Use    Smoking status: Never Smoker    Smokeless tobacco: Never Used   Substance Use Topics    Alcohol use: No     Frequency: Never    Drug use: No        Review of Systems:  Negative unless otherwise specified on HPI    OBJECTIVE:     Vital Signs:  Temp: 98.2 °F (36.8 °C) (08/18/18 0852)  Pulse: 92 (08/18/18 0852)  Resp: 20 (08/18/18 0852)  BP: 105/61 (08/18/18 0852)  SpO2: 97 % (08/18/18 0852)    Physical Exam:  General: well developed, well nourished, no distress  HEENT: normocephalic, atraumatic, hearing grossly normal bilaterally, mucous membranes moist, EOM intact, no scleral icterus  Neck: supple, symmetrical, trachea midline, no JVD  Lungs:  clear to auscultation bilaterally  and normal respiratory effort  Cardiovascular: regular rate and rhythm.  Extremities: no cyanosis or edema, or clubbing, distal pulses palpable and symmetric  Abdomen: soft, tender to palpation in RUQ, +Green, non distended  Skin: Skin color, texture, turgor normal. No rashes or lesions  Musculoskeletal:no clubbing, cyanosis, no deformities  Neurologic: No focal numbness or weakness  Psych/Behavioral:  Alert and oriented, appropriate affect.    Laboratory:  Labs Reviewed   CBC W/ AUTO DIFFERENTIAL - Abnormal; Notable for the following components:       Result Value    WBC 13.85 (*)     Gran # (ANC) 12.1 (*)     Lymph # 0.6 (*)     Mono # 1.2 (*)     Gran% 87.0 (*)     Lymph% 4.1 (*)     All other components within normal limits   COMPREHENSIVE METABOLIC PANEL - Abnormal; Notable for the following components:    Glucose 154 (*)     Total Bilirubin 3.2 (*)     All other components within normal limits   URINALYSIS - Abnormal; Notable for the following components:    Protein, UA 2+ (*)     Ketones, UA 1+ (*)     Bilirubin (UA) 1+ (*)     Occult Blood UA 2+ (*)     Leukocytes, UA 1+ (*)     All other components within normal limits   URINALYSIS MICROSCOPIC - Abnormal; Notable for the following components:    WBC, UA 10 (*)     Hyaline Casts, UA 10 (*)     Granular Casts, UA 2 (*)     Waxy Casts, UA 1 (*)     All other components within normal limits   LIPASE   LACTIC ACID, PLASMA       Diagnostic Results:  Imaging Results          CT Abdomen Pelvis With Contrast (Final result)  Result time 08/17/18 14:46:59    Final result by Carmine Post MD (08/17/18 14:46:59)                 Impression:      1 cm likely partially calcified polyp in the fundus of the gallbladder.  There is fat stranding surrounding the gallbladder, mild hyperemia of the gallbladder wall, and trace pericholecystic fluid. The gallbladder is mildly distended measuring 8.3 x 4.1 cm. Findings are moderately suspicious for cholecystitis. Consider HIDA  scan.      Electronically signed by: Carmine Post  Date:    08/17/2018  Time:    14:46             Narrative:    EXAMINATION:  CT ABDOMEN PELVIS WITH CONTRAST    CLINICAL HISTORY:  Abd pain, gastroenteritis or colitis suspected;    TECHNIQUE:  Low dose axial images, sagittal and coronal reformations were obtained from the lung bases to the pubic symphysis following the IV administration of 100 mL of Omnipaque 350 .  Oral contrast was not administered.    COMPARISON:  Abdominal ultrasound same day 08/17/2018    FINDINGS:  ABDOMEN:    - Lung bases: SUBSEGMENTAL ATELECTASIS, WITH GROUND-GLASS OF THE BILATERAL LUNG BASES.    - Liver: FATTY INFILTRATION OF THE LIVER.    - Gallbladder: 1 cm likely partially calcified polyp in the fundus of the gallbladder.  There is fat stranding surrounding the gallbladder, mild hyperemia of the gallbladder wall, and trace pericholecystic fluid.  The gallbladder is mildly distended measuring 8.3 x 4.1 cm.  Findings are moderately suspicious for cholecystitis.  Consider HIDA scan.    - Bile Ducts: No evidence of intra ductal dilation.  No calcified stones seen in the common bile duct.  Common bile duct is mildly prominent at 8 mm.    - Spleen: Negative.    - Kidneys: No mass or hydronephrosis.    - Adrenals: Unremarkable.    - Pancreas: No mass or peripancreatic fat stranding.    - Retroperitoneum:  No significant adenopathy.    - Vascular: No abdominal aortic aneurysm.    - Abdominal wall:  Unremarkable.    PELVIS:    No pelvic mass, adenopathy.  Trace free fluid in the pelvis.    BOWEL/MESENTERY:    No evidence of bowel obstruction or inflammation.    BONES:  No acute osseous abnormality and no suspicious lytic or blastic lesion.    All CT scans at this facility use dose modulation, iterative reconstruction and/or weight based dosing when appropriate to reduce radiation dose to as low as reasonably achievable.                               US Abdomen Limited (Final result)  Result time  08/17/18 12:45:03    Final result by Carmine Post MD (08/17/18 12:45:03)                 Impression:      Hepatomegaly with steatosis.    1 cm ill-defined echogenic focus in the fundus of the gallbladder, stone versus polyp. Recommend CT abdomen pelvis with IV contrast for further evaluation on a nonemergent basis or as clinically warranted.  Borderline gallbladder wall thickening measuring 3 mm thickness. Gallbladder is mildly distended measuring 11.6 x 4.1 cm. There is questionable pericholecystic fluid. No sonographic Green's sign.  This could also be evaluated with the above-described CT, or HIDA scan.      Electronically signed by: Carmine Post  Date:    08/17/2018  Time:    12:45             Narrative:    EXAMINATION:  US ABDOMEN LIMITED    CLINICAL HISTORY:  Abdominal Pain - Gallbladder;    TECHNIQUE:  Limited ultrasound of the right upper quadrant of the abdomen (including pancreas, liver, gallbladder, common bile duct, and right kidney) was performed.    COMPARISON:  None.    FINDINGS:  Liver: Hepatomegaly measuring 18.3 cm.  Homogeneous increased echotexture.  No focal hepatic lesions.    Gallbladder: 1 cm ill-defined echogenic focus in the fundus of the gallbladder, stone versus polyp.  Recommend CT abdomen pelvis with IV contrast for further evaluation.  Borderline gallbladder wall thickening measuring 3 mm thickness.  Gallbladder is mildly distended measuring 11.6 x 4.1 cm.  There is questionable pericholecystic fluid.  No sonographic Green's sign.    Biliary system: The common duct is not dilated, measuring 3.4 mm.  No intrahepatic ductal dilatation.    Right kidney: Normal in size with no hydronephrosis, measuring 10.7 cm.    Miscellaneous: No upper abdominal ascites.                                ASSESSMENT:     51 yo M w/ cholecystitis v choledocholithiasis    PLAN:     To OR for lap golden w/ IOC  RBA discussed with patient

## 2018-08-18 NOTE — NURSING
Spoke with MRI specialist, pt. Will be transported for scheduled exam.  Reported off to on coming nurse.

## 2018-08-18 NOTE — PROGRESS NOTES
Pt. Back to floor from procedure, VSS, NAD noted, no n/v/d or pain. Bed alarm active, safety has been maintained, will continue to monitor.

## 2018-08-18 NOTE — PLAN OF CARE
Problem: Patient Care Overview  Goal: Plan of Care Review  Outcome: Ongoing (interventions implemented as appropriate)  Pt. AAOx4, VSS, NAD noted, no n/v/d or pain, IV fluids infusing, wife informed on procedure today, pt wearing as needed oxygen, tolerating diet, bed alarm active, safety has been maintained, will continue to monitor.

## 2018-08-19 LAB
ALBUMIN SERPL BCP-MCNC: 2.7 G/DL
ALP SERPL-CCNC: 50 U/L
ALT SERPL W/O P-5'-P-CCNC: 28 U/L
ANION GAP SERPL CALC-SCNC: 6 MMOL/L
AST SERPL-CCNC: 43 U/L
BASOPHILS # BLD AUTO: 0 K/UL
BASOPHILS NFR BLD: 0 %
BILIRUB SERPL-MCNC: 1.8 MG/DL
BUN SERPL-MCNC: 30 MG/DL
CALCIUM SERPL-MCNC: 8.6 MG/DL
CHLORIDE SERPL-SCNC: 104 MMOL/L
CO2 SERPL-SCNC: 31 MMOL/L
CREAT SERPL-MCNC: 1.3 MG/DL
DIFFERENTIAL METHOD: ABNORMAL
EOSINOPHIL # BLD AUTO: 0 K/UL
EOSINOPHIL NFR BLD: 0 %
ERYTHROCYTE [DISTWIDTH] IN BLOOD BY AUTOMATED COUNT: 13 %
EST. GFR  (AFRICAN AMERICAN): >60 ML/MIN/1.73 M^2
EST. GFR  (NON AFRICAN AMERICAN): >60 ML/MIN/1.73 M^2
GLUCOSE SERPL-MCNC: 133 MG/DL
GRAM STN SPEC: NORMAL
GRAM STN SPEC: NORMAL
HCT VFR BLD AUTO: 34.3 %
HGB BLD-MCNC: 11.3 G/DL
LYMPHOCYTES # BLD AUTO: 0.5 K/UL
LYMPHOCYTES NFR BLD: 6.3 %
MCH RBC QN AUTO: 30.3 PG
MCHC RBC AUTO-ENTMCNC: 32.9 G/DL
MCV RBC AUTO: 92 FL
MONOCYTES # BLD AUTO: 0.6 K/UL
MONOCYTES NFR BLD: 7.9 %
NEUTROPHILS # BLD AUTO: 6.8 K/UL
NEUTROPHILS NFR BLD: 85.5 %
PLATELET # BLD AUTO: 119 K/UL
PMV BLD AUTO: 10.5 FL
POTASSIUM SERPL-SCNC: 4 MMOL/L
PROT SERPL-MCNC: 5.4 G/DL
RBC # BLD AUTO: 3.73 M/UL
SODIUM SERPL-SCNC: 141 MMOL/L
WBC # BLD AUTO: 7.89 K/UL

## 2018-08-19 PROCEDURE — 63600175 PHARM REV CODE 636 W HCPCS: Performed by: SURGERY

## 2018-08-19 PROCEDURE — 11000001 HC ACUTE MED/SURG PRIVATE ROOM

## 2018-08-19 PROCEDURE — 27000221 HC OXYGEN, UP TO 24 HOURS

## 2018-08-19 PROCEDURE — 63600175 PHARM REV CODE 636 W HCPCS: Performed by: STUDENT IN AN ORGANIZED HEALTH CARE EDUCATION/TRAINING PROGRAM

## 2018-08-19 PROCEDURE — 63600175 PHARM REV CODE 636 W HCPCS: Performed by: FAMILY MEDICINE

## 2018-08-19 PROCEDURE — 25000003 PHARM REV CODE 250: Performed by: SURGERY

## 2018-08-19 PROCEDURE — 36415 COLL VENOUS BLD VENIPUNCTURE: CPT

## 2018-08-19 PROCEDURE — 94761 N-INVAS EAR/PLS OXIMETRY MLT: CPT

## 2018-08-19 PROCEDURE — 25000003 PHARM REV CODE 250: Performed by: STUDENT IN AN ORGANIZED HEALTH CARE EDUCATION/TRAINING PROGRAM

## 2018-08-19 PROCEDURE — 94799 UNLISTED PULMONARY SVC/PX: CPT

## 2018-08-19 PROCEDURE — 85025 COMPLETE CBC W/AUTO DIFF WBC: CPT

## 2018-08-19 PROCEDURE — 80053 COMPREHEN METABOLIC PANEL: CPT

## 2018-08-19 RX ORDER — PANTOPRAZOLE SODIUM 40 MG/1
40 TABLET, DELAYED RELEASE ORAL 2 TIMES DAILY PRN
Status: DISCONTINUED | OUTPATIENT
Start: 2018-08-19 | End: 2018-08-19

## 2018-08-19 RX ORDER — FAMOTIDINE 20 MG/1
20 TABLET, FILM COATED ORAL 2 TIMES DAILY
Status: DISCONTINUED | OUTPATIENT
Start: 2018-08-19 | End: 2018-08-20 | Stop reason: HOSPADM

## 2018-08-19 RX ORDER — PANTOPRAZOLE SODIUM 40 MG/1
40 TABLET, DELAYED RELEASE ORAL 2 TIMES DAILY PRN
Status: DISCONTINUED | OUTPATIENT
Start: 2018-08-19 | End: 2018-08-20 | Stop reason: HOSPADM

## 2018-08-19 RX ORDER — MAG HYDROX/ALUMINUM HYD/SIMETH 200-200-20
30 SUSPENSION, ORAL (FINAL DOSE FORM) ORAL EVERY 6 HOURS PRN
Status: DISCONTINUED | OUTPATIENT
Start: 2018-08-19 | End: 2018-08-20 | Stop reason: HOSPADM

## 2018-08-19 RX ADMIN — PANTOPRAZOLE SODIUM 40 MG: 40 TABLET, DELAYED RELEASE ORAL at 10:08

## 2018-08-19 RX ADMIN — PIPERACILLIN AND TAZOBACTAM 4.5 G: 4; .5 INJECTION, POWDER, LYOPHILIZED, FOR SOLUTION INTRAVENOUS; PARENTERAL at 09:08

## 2018-08-19 RX ADMIN — ALUMINUM HYDROXIDE, MAGNESIUM HYDROXIDE, AND SIMETHICONE 30 ML: 200; 200; 20 SUSPENSION ORAL at 11:08

## 2018-08-19 RX ADMIN — POLYETHYLENE GLYCOL 3350 17 G: 17 POWDER, FOR SOLUTION ORAL at 08:08

## 2018-08-19 RX ADMIN — FAMOTIDINE 20 MG: 20 TABLET ORAL at 08:08

## 2018-08-19 RX ADMIN — PIPERACILLIN AND TAZOBACTAM 4.5 G: 4; .5 INJECTION, POWDER, LYOPHILIZED, FOR SOLUTION INTRAVENOUS; PARENTERAL at 10:08

## 2018-08-19 RX ADMIN — PANTOPRAZOLE SODIUM 40 MG: 40 TABLET, DELAYED RELEASE ORAL at 08:08

## 2018-08-19 RX ADMIN — MORPHINE SULFATE 2 MG: 2 INJECTION, SOLUTION INTRAMUSCULAR; INTRAVENOUS at 10:08

## 2018-08-19 RX ADMIN — MORPHINE SULFATE 2 MG: 2 INJECTION, SOLUTION INTRAMUSCULAR; INTRAVENOUS at 04:08

## 2018-08-19 RX ADMIN — SODIUM CHLORIDE, SODIUM LACTATE, POTASSIUM CHLORIDE, AND CALCIUM CHLORIDE: .6; .31; .03; .02 INJECTION, SOLUTION INTRAVENOUS at 06:08

## 2018-08-19 RX ADMIN — KETOROLAC TROMETHAMINE 15 MG: 30 INJECTION, SOLUTION INTRAMUSCULAR at 06:08

## 2018-08-19 RX ADMIN — FAMOTIDINE 20 MG: 20 TABLET ORAL at 11:08

## 2018-08-19 NOTE — PROGRESS NOTES
GENERAL SURGERY  PROGRESS NOTE    Admit Date: 8/17/2018  Hospital Day: 2  Procedure: 1 Day Post-Op s/p lap golden w/ IOC    NAEON.  Pain well controlled.  Ambulating short distances.  Tolerating some PO.  No BM/flatus.      Physical Exam:  No apparent distress, alert/oriented x 2  No scleral icterus  Regular rate and rhythm  Normal work of breathing  Abdomen soft, appropriate tenderness to palpation, incisions c/d/i    Assessment:  53 yo M w/ cholecystitis, found to have necrotic gallbladder intraop, now POD 1 s/p lap golden    Plan:  Cont PRN pain regimen  IS/OOB/DB  PO as tolerated  Advance diet as tolerated  Home GERD regimen  LFTs, tbili trending down  Likely home tomorrow      Inpatient Data      Vitals:   Temp:  [97.8 °F (36.6 °C)-99.5 °F (37.5 °C)]   Pulse:  [59-98]   Resp:  [16-20]   BP: (107-143)/(56-84)   SpO2:  [92 %-97 %]     Diet clear liquid   Intake/Output Summary (Last 24 hours) at 8/19/2018 0958  Last data filed at 8/19/2018 0600  Gross per 24 hour   Intake 5241.67 ml   Output 500 ml   Net 4741.67 ml          Recent Labs      08/17/18   1144  08/18/18   0319  08/19/18   0334   WBC  13.85*  10.14  7.89   HGB  14.9  12.7*  11.3*   HCT  43.7  38.5*  34.3*   PLT  193  133*  119*   NA  141  140  141   K  4.0  4.0  4.0   CL  104  106  104   CO2  25  27  31*   BUN  15  21*  30*   CREATININE  1.28  1.4  1.3   BILITOT  3.2*  2.8*  1.8*   AST  28  23  43*   ALT  19  12  28   ALKPHOS  61  60  50*   CALCIUM  9.3  8.6*  8.6*   ALBUMIN  4.4  3.4*  2.7*   PROT  7.4  6.1  5.4*     Scheduled Meds:   piperacillin-tazobactam (ZOSYN) IVPB 4.5 g Q12H   polyethylene glycol 17 g Daily      lactated ringers 100 mL/hr at 08/19/18 0626

## 2018-08-19 NOTE — PLAN OF CARE
Problem: Patient Care Overview  Goal: Plan of Care Review  Outcome: Ongoing (interventions implemented as appropriate)  Pt. AAOx4,VSS, NAD noted, complaints of heartburn today that was relieved by PRN medications. Bed alarm active, safety has been maintained, will continue to monitor.

## 2018-08-19 NOTE — PLAN OF CARE
Problem: Patient Care Overview  Goal: Plan of Care Review  Outcome: Ongoing (interventions implemented as appropriate)  Pt received on NC at 4 LPM. Pt SpO2 97%. No respiratory distress. Will continue to monitor.

## 2018-08-19 NOTE — PLAN OF CARE
Problem: Patient Care Overview  Goal: Plan of Care Review  Outcome: Ongoing (interventions implemented as appropriate)  Patient medicated with IV zosyn as ordered, IV fluids in progress. Morphine and Ketorolac IV given several times overnight for pain with good results. Remains free from falls, bed alarm in use and calling for assistance to the bathroom

## 2018-08-20 VITALS
SYSTOLIC BLOOD PRESSURE: 133 MMHG | HEIGHT: 71 IN | WEIGHT: 219.81 LBS | HEART RATE: 74 BPM | OXYGEN SATURATION: 93 % | TEMPERATURE: 99 F | RESPIRATION RATE: 18 BRPM | BODY MASS INDEX: 30.77 KG/M2 | DIASTOLIC BLOOD PRESSURE: 85 MMHG

## 2018-08-20 PROBLEM — K81.0 ACUTE GANGRENOUS CHOLECYSTITIS: Status: RESOLVED | Noted: 2018-08-17 | Resolved: 2018-08-20

## 2018-08-20 LAB
ALBUMIN SERPL BCP-MCNC: 2.6 G/DL
ALP SERPL-CCNC: 55 U/L
ALT SERPL W/O P-5'-P-CCNC: 29 U/L
ANION GAP SERPL CALC-SCNC: 5 MMOL/L
AST SERPL-CCNC: 36 U/L
BASOPHILS # BLD AUTO: 0.01 K/UL
BASOPHILS NFR BLD: 0.1 %
BILIRUB SERPL-MCNC: 1.8 MG/DL
BUN SERPL-MCNC: 24 MG/DL
CALCIUM SERPL-MCNC: 8.7 MG/DL
CHLORIDE SERPL-SCNC: 102 MMOL/L
CO2 SERPL-SCNC: 26 MMOL/L
CREAT SERPL-MCNC: 1.1 MG/DL
DIFFERENTIAL METHOD: ABNORMAL
EOSINOPHIL # BLD AUTO: 0.1 K/UL
EOSINOPHIL NFR BLD: 1.9 %
ERYTHROCYTE [DISTWIDTH] IN BLOOD BY AUTOMATED COUNT: 12.9 %
EST. GFR  (AFRICAN AMERICAN): >60 ML/MIN/1.73 M^2
EST. GFR  (NON AFRICAN AMERICAN): >60 ML/MIN/1.73 M^2
GLUCOSE SERPL-MCNC: 91 MG/DL
HCT VFR BLD AUTO: 34.8 %
HGB BLD-MCNC: 11.6 G/DL
LYMPHOCYTES # BLD AUTO: 0.7 K/UL
LYMPHOCYTES NFR BLD: 10 %
MCH RBC QN AUTO: 30.4 PG
MCHC RBC AUTO-ENTMCNC: 33.3 G/DL
MCV RBC AUTO: 91 FL
MONOCYTES # BLD AUTO: 0.7 K/UL
MONOCYTES NFR BLD: 10.2 %
NEUTROPHILS # BLD AUTO: 5.7 K/UL
NEUTROPHILS NFR BLD: 77.7 %
PLATELET # BLD AUTO: 147 K/UL
PMV BLD AUTO: 10.3 FL
POTASSIUM SERPL-SCNC: 3.3 MMOL/L
PROT SERPL-MCNC: 5.7 G/DL
RBC # BLD AUTO: 3.82 M/UL
SODIUM SERPL-SCNC: 133 MMOL/L
WBC # BLD AUTO: 7.28 K/UL

## 2018-08-20 PROCEDURE — 94761 N-INVAS EAR/PLS OXIMETRY MLT: CPT

## 2018-08-20 PROCEDURE — 85025 COMPLETE CBC W/AUTO DIFF WBC: CPT

## 2018-08-20 PROCEDURE — 80053 COMPREHEN METABOLIC PANEL: CPT

## 2018-08-20 PROCEDURE — 94799 UNLISTED PULMONARY SVC/PX: CPT

## 2018-08-20 PROCEDURE — 25000003 PHARM REV CODE 250: Performed by: SURGERY

## 2018-08-20 PROCEDURE — 36415 COLL VENOUS BLD VENIPUNCTURE: CPT

## 2018-08-20 PROCEDURE — 25000003 PHARM REV CODE 250: Performed by: STUDENT IN AN ORGANIZED HEALTH CARE EDUCATION/TRAINING PROGRAM

## 2018-08-20 RX ORDER — POTASSIUM CHLORIDE 20 MEQ/1
20 TABLET, EXTENDED RELEASE ORAL EVERY 4 HOURS
Status: DISCONTINUED | OUTPATIENT
Start: 2018-08-20 | End: 2018-08-20

## 2018-08-20 RX ORDER — AMOXICILLIN AND CLAVULANATE POTASSIUM 875; 125 MG/1; MG/1
1 TABLET, FILM COATED ORAL EVERY 12 HOURS
Qty: 8 TABLET | Refills: 0 | Status: SHIPPED | OUTPATIENT
Start: 2018-08-20 | End: 2018-08-24

## 2018-08-20 RX ORDER — ONDANSETRON 4 MG/1
4 TABLET, FILM COATED ORAL EVERY 6 HOURS
Qty: 12 TABLET | Refills: 0 | Status: SHIPPED | OUTPATIENT
Start: 2018-08-20

## 2018-08-20 RX ORDER — POTASSIUM CHLORIDE 20 MEQ/1
20 TABLET, EXTENDED RELEASE ORAL
Status: COMPLETED | OUTPATIENT
Start: 2018-08-20 | End: 2018-08-20

## 2018-08-20 RX ORDER — HYDROCODONE BITARTRATE AND ACETAMINOPHEN 7.5; 325 MG/1; MG/1
1 TABLET ORAL EVERY 6 HOURS PRN
Qty: 20 TABLET | Refills: 0 | Status: SHIPPED | OUTPATIENT
Start: 2018-08-20

## 2018-08-20 RX ADMIN — POTASSIUM CHLORIDE 20 MEQ: 1500 TABLET, EXTENDED RELEASE ORAL at 10:08

## 2018-08-20 RX ADMIN — FAMOTIDINE 20 MG: 20 TABLET ORAL at 09:08

## 2018-08-20 RX ADMIN — POTASSIUM CHLORIDE 20 MEQ: 1500 TABLET, EXTENDED RELEASE ORAL at 09:08

## 2018-08-20 NOTE — PROGRESS NOTES
After receiving report from TULIO Harrison, I am assuming care of this patient for the rest of the shift.

## 2018-08-20 NOTE — PROGRESS NOTES
Discharge instructions and paper prescriptions provided to pt.  Also given and educated on opioid handout.  No questions or concerns at this time.  Pt discharged to home.

## 2018-08-20 NOTE — DISCHARGE SUMMARY
[facility]  Discharge Summary  General Surgery      Admit Date: 8/17/2018    Discharge Date and Time:  08/20/2018 8:22 AM    Attending Physician: Quincy Perez MD     Discharge Provider: Ramos Wall    Reason for Admission: Abdominal pain    Procedures Performed: Procedure(s):  CHOLECYSTECTOMY, LAPAROSCOPIC, WITH CHOLANGIOGRAM    Hospital Course Patient is a 51 yo M w/ PMH of GERD who presesnts as a transfer from outside hospital with a one day history of severe abdominal pain and nausea, found to be secondary to acute cholecystitis.  He was admitted to the surgery service, and started on IVF/IV antibiotics upon arrival 8/17.  At the time he was noted to have a bilirubin of 3.5.  Bilirubin the following morning was 2.5.  The following morning, he was taken to the OR for laparoscopic cholecystectomy and IOC, which was carried out without complication.  Intraoperative IOC not consistent with any distal biliary obstruction, the gallbladder was also noted to be diffusely necrotic, and gangrenous.  Patient progressed well in the postoperative period.  As of POD 2: his pain is well controlled, he is able to ambulate indpendently, he is tolerating PO, and passing flatus.  Bilirubin on day of discharge continues to trend down, but remains elevated at 1.8.  He lives in [place], and going home upon discharge.  Patient is instructed to obtain additional labs in approximately one week.  He was also instructed to follow up with a local doctor, but a follow up appointment has been made in three weeks with Dr. Mathew.  Discharge home with pain medications, and a short course of PO antibiotics.    Consults: none    Significant Diagnostic Studies: As above    Final Diagnoses:   Principal Problem: Acute gangrenous cholecystitis   Secondary Diagnoses:   Active Hospital Problems   No active problems to display.      Resolved Hospital Problems    Diagnosis Date Resolved POA    *Acute  gangrenous cholecystitis [K81.0] 08/20/2018 Unknown       Discharged Condition: good    Disposition: Home or Self Care    Follow Up/Patient Instructions:     Medications:  Reconciled Home Medications:      Medication List      START taking these medications    HYDROcodone-acetaminophen 7.5-325 mg per tablet  Commonly known as:  NORCO  Take 1 tablet by mouth every 6 (six) hours as needed for Pain.     ondansetron 4 MG tablet  Commonly known as:  ZOFRAN  Take 1 tablet (4 mg total) by mouth every 6 (six) hours.        CONTINUE taking these medications    esomeprazole 20 MG capsule  Commonly known as:  NEXIUM  Take 20 mg by mouth before breakfast.     PriLOSEC OTC 20 MG tablet  Generic drug:  omeprazole  Take 20 mg by mouth once daily.          Discharge Procedure Orders   Comprehensive metabolic panel   Standing Status: Future Standing Exp. Date: 10/19/19     CBC auto differential   Standing Status: Future Standing Exp. Date: 10/19/19     Diet Adult Regular     Other restrictions (specify):   Order Comments: No heavy lifting or vigorous physical activity    No driving while taking pain medication    OK to shower    Walk frequently     Notify your health care provider if you experience any of the following:  temperature >100.4     Notify your health care provider if you experience any of the following:  persistent nausea and vomiting or diarrhea     Notify your health care provider if you experience any of the following:  severe uncontrolled pain     Notify your health care provider if you experience any of the following:  redness, tenderness, or signs of infection (pain, swelling, redness, odor or green/yellow discharge around incision site)     Follow-up Information     General Surgeon.    Contact information:  See your surgeon immediately. Go to nearest hospital immediately, if worsening.           Quincy Perez MD In 3 weeks.    Specialty:  General Surgery  Why:  No need to follow up in [place] if doing  well, see a physician locally as follow up  Contact information:  [address]  [address]  [place] [address]  [phone]                   On Pathway: Yes  If No, then state reason why:

## 2018-08-20 NOTE — PLAN OF CARE
TN met with pt prior to d/c   for d/c today   pt will hail an Uber ride to [facility] then drive self to home ([place])   pt didn't know the name of his [place] MD - [place] spoke with wife Jillian  [phone] - got name/# of [place] MD Dr. Felisa Zavala  p    fax    TN will fax d/c summ/op report to pt's [place] MD    Discharge rounds on patient. Discussed followup appointments, blue discharge folder, discharge nurse will go over home medications and reasons for medications and final discharge instructions. All patient/caregiver questions answered. Patient verbalized understanding.         08/20/18 1122   Final Note   Assessment Type Final Discharge Note   Discharge Disposition Home   What phone number can be called within the next 1-3 days to see how you are doing after discharge? [phone]   Hospital Follow Up  Appt(s) scheduled? (pt will f/u with Dr. Felisa Zavala  [phone] in [place]  )   Discharge plans and expectations educations in teach back method with documentation complete? Yes   Right Care Referral Info   Post Acute Recommendation No Care

## 2018-08-20 NOTE — PLAN OF CARE
Problem: Patient Care Overview  Goal: Plan of Care Review  Outcome: Ongoing (interventions implemented as appropriate)  Pt on RA with documented sats.93. Patient performing incentive spirometry at 500 ml. Will continue to monitor.

## 2018-08-22 LAB
BACTERIA BLD CULT: NORMAL
BACTERIA BLD CULT: NORMAL

## 2018-08-23 LAB — BACTERIA SPEC ANAEROBE CULT: NORMAL

## 2018-08-24 LAB
BACTERIA SPEC AEROBE CULT: NORMAL
BACTERIA SPEC AEROBE CULT: NORMAL

## 2018-09-18 LAB — FUNGUS SPEC CULT: NORMAL

## 2018-10-20 LAB
ACID FAST MOD KINY STN SPEC: NORMAL
MYCOBACTERIUM SPEC QL CULT: NORMAL

## 2023-08-09 NOTE — ED NOTES
Pt amb to restroom to urinate w/o diff.   Taltz Counseling: I discussed with the patient the risks of ixekizumab including but not limited to immunosuppression, serious infections, worsening of inflammatory bowel disease and drug reactions.  The patient understands that monitoring is required including a PPD at baseline and must alert us or the primary physician if symptoms of infection or other concerning signs are noted.

## (undated) DEVICE — CATH CHOLANGIO 4.5F STL TP

## (undated) DEVICE — SUT 0 VICRYL / UR6 (J603)

## (undated) DEVICE — SLEEVE SCD EXPRESS CALF MEDIUM

## (undated) DEVICE — SEE MEDLINE ITEM 156952

## (undated) DEVICE — HEMOSTAT SURGICEL 4X8IN

## (undated) DEVICE — POWDER ARISTA AH 3G

## (undated) DEVICE — ELECTRODE REM PLYHSV RETURN 9

## (undated) DEVICE — ADHESIVE DERMABOND ADVANCED

## (undated) DEVICE — NDL HYPDRM 23X15

## (undated) DEVICE — MANIFOLD 4 PORT

## (undated) DEVICE — CATH IV INTROCAN 16G X 1 1/4

## (undated) DEVICE — SOL IRR NACL .9% 3000ML

## (undated) DEVICE — NDL INSUF ULTRA VERESS 120MM

## (undated) DEVICE — SUPPORT ULNA NERVE PROTECTOR

## (undated) DEVICE — APPLIER CLIP ENDO LIGAMAX 5MM

## (undated) DEVICE — CATH IV INTROCAN 14G X 2.

## (undated) DEVICE — SUT MCRYL PLUS 4-0 PS2 27IN

## (undated) DEVICE — SCISSOR 5MMX35CM DIRECT DRIVE

## (undated) DEVICE — DISSECTOR 5MM ENDOPATH

## (undated) DEVICE — APPLIER CLIP ENDO MED/LG 10MM

## (undated) DEVICE — DRAPE INCISE IOBAN 2 23X23IN

## (undated) DEVICE — APPLICATOR ARISTA FLEX XL

## (undated) DEVICE — GLOVE SURG BIOGEL LATEX SZ 7.5

## (undated) DEVICE — SUT MONOCRYL 3-0 PS-2 UND